# Patient Record
Sex: FEMALE | Race: WHITE
[De-identification: names, ages, dates, MRNs, and addresses within clinical notes are randomized per-mention and may not be internally consistent; named-entity substitution may affect disease eponyms.]

---

## 2021-09-20 ENCOUNTER — HOSPITAL ENCOUNTER (OUTPATIENT)
Dept: HOSPITAL 53 - M WHC | Age: 36
End: 2021-09-20
Attending: ADVANCED PRACTICE MIDWIFE
Payer: COMMERCIAL

## 2021-09-20 DIAGNOSIS — O34.211: Primary | ICD-10-CM

## 2021-10-19 ENCOUNTER — HOSPITAL ENCOUNTER (OUTPATIENT)
Dept: HOSPITAL 53 - M SFHCWAGY | Age: 36
End: 2021-10-19
Attending: OBSTETRICS & GYNECOLOGY
Payer: COMMERCIAL

## 2021-10-19 ENCOUNTER — HOSPITAL ENCOUNTER (OUTPATIENT)
Dept: HOSPITAL 53 - M WHC | Age: 36
End: 2021-10-19
Attending: OBSTETRICS & GYNECOLOGY
Payer: COMMERCIAL

## 2021-10-19 DIAGNOSIS — O09.522: Primary | ICD-10-CM

## 2021-10-19 NOTE — REP
INDICATION:

F/U ANATOMY



COMPARISON:

09/20/2021



TECHNIQUE:

Transabdominal obstetrical ultrasound with color Doppler evaluation.



FINDINGS:

Examination demonstrates a single live intrauterine pregnancy in variable

presentation.  Fetal motion is identified by technologist.  Placenta is noted

posterior and  earlier grade 1 without evidence for placenta previa or abruption.

Amniotic fluid volume is normal.  Cervix measures 4.3 cm in length and appears closed..



Selected gestational age: 23 weeks 6 days with SANTIAGO 02/09/2022.

Gestational age by current measurements 23 weeks 4 days with SANTIAGO 02/11/2022.



FHR equals 160 beats per minute.



Estimated fetal weight 616 grams (33rdpercentile).



Anatomical assessment demonstrates normal structures including four-chamber

heart/ventricular outflow tracts, and spine.



IMPRESSION:

Single live intrauterine pregnancy in variable presentation demonstrating appropriate

estimated fetal weight and growth.  In conjunction with prior examination anatomical

assessment is complete and normal.





<Electronically signed by Lyle Baldwin > 10/19/21 [id number]

## 2021-11-17 ENCOUNTER — HOSPITAL ENCOUNTER (OUTPATIENT)
Dept: HOSPITAL 53 - M PLALAB | Age: 36
End: 2021-11-17
Attending: OBSTETRICS & GYNECOLOGY
Payer: COMMERCIAL

## 2021-11-17 DIAGNOSIS — Z34.82: Primary | ICD-10-CM

## 2021-11-17 LAB
HCT VFR BLD AUTO: 35 % (ref 36–47)
HGB BLD-MCNC: 10.7 G/DL (ref 12–15.5)
MCH RBC QN AUTO: 27.1 PG (ref 27–33)
MCHC RBC AUTO-ENTMCNC: 30.6 G/DL (ref 32–36.5)
MCV RBC AUTO: 88.6 FL (ref 80–96)
N GONORRHOEA RRNA SPEC QL NAA+PROBE: NEGATIVE
PLATELET # BLD AUTO: 130 10^3/UL (ref 150–450)
RBC # BLD AUTO: 3.95 10^6/UL (ref 4–5.4)
WBC # BLD AUTO: 8.4 10^3/UL (ref 4–10)

## 2022-05-02 ENCOUNTER — HOSPITAL ENCOUNTER (EMERGENCY)
Dept: HOSPITAL 53 - M ED | Age: 37
Discharge: HOME | End: 2022-05-02
Payer: COMMERCIAL

## 2022-05-02 VITALS — DIASTOLIC BLOOD PRESSURE: 74 MMHG | SYSTOLIC BLOOD PRESSURE: 149 MMHG

## 2022-05-02 VITALS — HEIGHT: 65 IN | BODY MASS INDEX: 27.95 KG/M2 | WEIGHT: 167.77 LBS

## 2022-05-02 DIAGNOSIS — S82.64XA: Primary | ICD-10-CM
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
## 2022-05-10 ENCOUNTER — HOSPITAL ENCOUNTER (OUTPATIENT)
Dept: HOSPITAL 53 - M SOG | Age: 37
End: 2022-05-10
Attending: PHYSICIAN ASSISTANT
Payer: COMMERCIAL
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
## 2022-05-10 DIAGNOSIS — S93.401A: Primary | ICD-10-CM

## 2022-05-10 DIAGNOSIS — S82.61XA: ICD-10-CM
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
